# Patient Record
Sex: MALE | Race: WHITE | Employment: FULL TIME | URBAN - METROPOLITAN AREA
[De-identification: names, ages, dates, MRNs, and addresses within clinical notes are randomized per-mention and may not be internally consistent; named-entity substitution may affect disease eponyms.]

---

## 2018-10-06 ENCOUNTER — APPOINTMENT (OUTPATIENT)
Dept: CT IMAGING | Age: 28
End: 2018-10-06
Payer: COMMERCIAL

## 2018-10-06 ENCOUNTER — HOSPITAL ENCOUNTER (EMERGENCY)
Age: 28
Discharge: HOME | End: 2018-10-07
Attending: EMERGENCY MEDICINE
Payer: COMMERCIAL

## 2018-10-06 VITALS
DIASTOLIC BLOOD PRESSURE: 98 MMHG | HEART RATE: 98 BPM | HEIGHT: 68 IN | RESPIRATION RATE: 16 BRPM | TEMPERATURE: 100 F | WEIGHT: 285.5 LBS | BODY MASS INDEX: 43.27 KG/M2 | SYSTOLIC BLOOD PRESSURE: 160 MMHG | OXYGEN SATURATION: 98 %

## 2018-10-06 DIAGNOSIS — R20.2 NUMBNESS AND TINGLING OF LEFT SIDE OF FACE: ICD-10-CM

## 2018-10-06 DIAGNOSIS — R20.2 NUMBNESS AND TINGLING IN LEFT ARM: Primary | ICD-10-CM

## 2018-10-06 DIAGNOSIS — R20.0 NUMBNESS AND TINGLING OF LEFT SIDE OF FACE: ICD-10-CM

## 2018-10-06 DIAGNOSIS — R20.0 NUMBNESS AND TINGLING IN LEFT ARM: Primary | ICD-10-CM

## 2018-10-06 LAB
BASOPHILS ABSOLUTE: 0.1 K/UL (ref 0–0.2)
BASOPHILS RELATIVE PERCENT: 0.5 %
EOSINOPHILS ABSOLUTE: 0.3 K/UL (ref 0–0.6)
EOSINOPHILS RELATIVE PERCENT: 2.7 %
HCT VFR BLD CALC: 41.9 % (ref 40.5–52.5)
HEMOGLOBIN: 14.3 G/DL (ref 13.5–17.5)
LYMPHOCYTES ABSOLUTE: 2.2 K/UL (ref 1–5.1)
LYMPHOCYTES RELATIVE PERCENT: 22.6 %
MCH RBC QN AUTO: 30.4 PG (ref 26–34)
MCHC RBC AUTO-ENTMCNC: 34.2 G/DL (ref 31–36)
MCV RBC AUTO: 88.9 FL (ref 80–100)
MONOCYTES ABSOLUTE: 1.1 K/UL (ref 0–1.3)
MONOCYTES RELATIVE PERCENT: 11.3 %
NEUTROPHILS ABSOLUTE: 6.1 K/UL (ref 1.7–7.7)
NEUTROPHILS RELATIVE PERCENT: 62.9 %
PDW BLD-RTO: 12.7 % (ref 12.4–15.4)
PLATELET # BLD: 325 K/UL (ref 135–450)
PMV BLD AUTO: 7.7 FL (ref 5–10.5)
RBC # BLD: 4.71 M/UL (ref 4.2–5.9)
WBC # BLD: 9.8 K/UL (ref 4–11)

## 2018-10-06 PROCEDURE — 85025 COMPLETE CBC W/AUTO DIFF WBC: CPT

## 2018-10-06 PROCEDURE — 36415 COLL VENOUS BLD VENIPUNCTURE: CPT

## 2018-10-06 PROCEDURE — 99284 EMERGENCY DEPT VISIT MOD MDM: CPT

## 2018-10-06 PROCEDURE — 93005 ELECTROCARDIOGRAM TRACING: CPT | Performed by: EMERGENCY MEDICINE

## 2018-10-06 PROCEDURE — 70450 CT HEAD/BRAIN W/O DYE: CPT

## 2018-10-06 PROCEDURE — 80048 BASIC METABOLIC PNL TOTAL CA: CPT

## 2018-10-07 LAB
ANION GAP SERPL CALCULATED.3IONS-SCNC: 17 MMOL/L (ref 3–16)
BUN BLDV-MCNC: 14 MG/DL (ref 7–20)
CALCIUM SERPL-MCNC: 9.4 MG/DL (ref 8.3–10.6)
CHLORIDE BLD-SCNC: 101 MMOL/L (ref 99–110)
CO2: 23 MMOL/L (ref 21–32)
CREAT SERPL-MCNC: 1 MG/DL (ref 0.9–1.3)
GFR AFRICAN AMERICAN: >60
GFR NON-AFRICAN AMERICAN: >60
GLUCOSE BLD-MCNC: 100 MG/DL (ref 70–99)
POTASSIUM SERPL-SCNC: 3.8 MMOL/L (ref 3.5–5.1)
SODIUM BLD-SCNC: 141 MMOL/L (ref 136–145)

## 2018-10-07 PROCEDURE — 6370000000 HC RX 637 (ALT 250 FOR IP): Performed by: EMERGENCY MEDICINE

## 2018-10-07 PROCEDURE — 93010 ELECTROCARDIOGRAM REPORT: CPT | Performed by: INTERNAL MEDICINE

## 2018-10-07 RX ADMIN — ASPIRIN 325 MG: 325 TABLET, DELAYED RELEASE ORAL at 00:51

## 2018-10-07 NOTE — ED PROVIDER NOTES
[10/06/18 2245]   BP Temp Temp Source Pulse Resp SpO2 Height Weight   (!) 160/98 100 °F (37.8 °C) Oral 98 16 98 % 5' 8\" (1.727 m) 285 lb 7.9 oz (129.5 kg)       Physical Exam  General appearance: Alert, cooperative, no distress, appears stated age. Head:  Normocephalic, without obvious abnormality, atraumatic. HEENT: Mucous membranes moist.  Neck: Full ROM, trachea midline, no JVD  Lungs: No respiratory distress  Cardiovasular: Perfusing extremities  Abdomen: Nontender, no guarding  Extremities: Atraumatic, full ROM  Skin: No rashes or lesions to exposed skin  Neurologic: Alert and oriented x3. Cranial nerves II through XII intact aside from subjective reduced sensation roughly in the left V2 distribution on the cheek but not extending back to the ear. Strength is normal and symmetrical in the extremities. Patient does have subjective decreased sensation on the dorsal left forearm and dorsal left hand. DIAGNOSTIC RESULTS     EKG: EKG shows a normal sinus rhythm without STEMI, arrhythmogenic condition such as Brugada/ARVD. QTC/ID intervals normal.    RADIOLOGY:   Non-plain film images such as CT, Ultrasound and MRI are read by the radiologist. Plain radiographic images are visualized and preliminarily interpreted by the emergency physician with the below findings:        Interpretation per the Radiologist below, if available at the time of this note:    CT Head WO Contrast   Final Result   No acute intracranial abnormality.                ED BEDSIDE ULTRASOUND:   Performed by ED Physician - none    LABS:  Labs Reviewed   BASIC METABOLIC PANEL - Abnormal; Notable for the following:        Result Value    Anion Gap 17 (*)     Glucose 100 (*)     All other components within normal limits    Narrative:     Performed at:  [facility]) - [facility] [facility]  [address] [facility]   Phone [phone]   CBC WITH AUTO DIFFERENTIAL    Narrative:     Performed at:  [facility]) -

## 2018-10-09 LAB
EKG ATRIAL RATE: 94 BPM
EKG DIAGNOSIS: NORMAL
EKG P AXIS: 36 DEGREES
EKG P-R INTERVAL: 146 MS
EKG Q-T INTERVAL: 342 MS
EKG QRS DURATION: 76 MS
EKG QTC CALCULATION (BAZETT): 427 MS
EKG R AXIS: 32 DEGREES
EKG T AXIS: 19 DEGREES
EKG VENTRICULAR RATE: 94 BPM

## 2018-10-17 ENCOUNTER — OFFICE VISIT (OUTPATIENT)
Dept: PRIMARY CARE CLINIC | Age: 28
End: 2018-10-17
Payer: COMMERCIAL

## 2018-10-17 VITALS
DIASTOLIC BLOOD PRESSURE: 84 MMHG | BODY MASS INDEX: 43.5 KG/M2 | TEMPERATURE: 98.8 F | HEART RATE: 100 BPM | SYSTOLIC BLOOD PRESSURE: 110 MMHG | HEIGHT: 68 IN | OXYGEN SATURATION: 98 % | WEIGHT: 287 LBS

## 2018-10-17 DIAGNOSIS — R20.2 NUMBNESS AND TINGLING: Primary | ICD-10-CM

## 2018-10-17 DIAGNOSIS — R20.0 NUMBNESS AND TINGLING: Primary | ICD-10-CM

## 2018-10-17 PROCEDURE — 99202 OFFICE O/P NEW SF 15 MIN: CPT | Performed by: NURSE PRACTITIONER

## 2018-10-17 ASSESSMENT — PATIENT HEALTH QUESTIONNAIRE - PHQ9
2. FEELING DOWN, DEPRESSED OR HOPELESS: 0
1. LITTLE INTEREST OR PLEASURE IN DOING THINGS: 0
SUM OF ALL RESPONSES TO PHQ QUESTIONS 1-9: 0
SUM OF ALL RESPONSES TO PHQ QUESTIONS 1-9: 0
SUM OF ALL RESPONSES TO PHQ9 QUESTIONS 1 & 2: 0

## 2018-10-17 ASSESSMENT — ENCOUNTER SYMPTOMS
COUGH: 0
ABDOMINAL PAIN: 0
DIARRHEA: 0
SHORTNESS OF BREATH: 0
EYE DISCHARGE: 0
CONSTIPATION: 0

## 2018-10-17 NOTE — PROGRESS NOTES
[facility] Note    Date: 10/17/2018                                               Subjective/Objective:     Chief Complaint   Patient presents with   Evern Dural ED Follow-up     from 10/06/18 he had face numbness and left arm, but thats all gone now        HPI  69-year-old male patient comes to the office today for an emergency room follow up with regarding the facial numbness that he had. Patient was seen October 6, 2018 in the emergency room with left facial numbness and left arm numbness. Labs were obtained and all were normal he had a head CT was negative for any stroke. Since that time the patient states his symptoms have completely resolved. And he denies any other complaints. Objective   There are no active problems to display for this patient. No past medical history on file. No past surgical history on file.     Admission on 10/06/2018, Discharged on 10/07/2018   Component Date Value Ref Range Status    WBC 10/06/2018 9.8  4.0 - 11.0 K/uL Final    RBC 10/06/2018 4.71  4.20 - 5.90 M/uL Final    Hemoglobin 10/06/2018 14.3  13.5 - 17.5 g/dL Final    Hematocrit 10/06/2018 41.9  40.5 - 52.5 % Final    MCV 10/06/2018 88.9  80.0 - 100.0 fL Final    MCH 10/06/2018 30.4  26.0 - 34.0 pg Final    MCHC 10/06/2018 34.2  31.0 - 36.0 g/dL Final    RDW 10/06/2018 12.7  12.4 - 15.4 % Final    Platelets 81/33/6803 325  135 - 450 K/uL Final    MPV 10/06/2018 7.7  5.0 - 10.5 fL Final    Neutrophils % 10/06/2018 62.9  % Final    Lymphocytes % 10/06/2018 22.6  % Final    Monocytes % 10/06/2018 11.3  % Final    Eosinophils % 10/06/2018 2.7  % Final    Basophils % 10/06/2018 0.5  % Final    Neutrophils # 10/06/2018 6.1  1.7 - 7.7 K/uL Final    Lymphocytes # 10/06/2018 2.2  1.0 - 5.1 K/uL Final    Monocytes # 10/06/2018 1.1  0.0 - 1.3 K/uL Final    Eosinophils # 10/06/2018 0.3  0.0 - 0.6 K/uL Final    Basophils # 10/06/2018 0.1  0.0 - 0.2 K/uL Final    Sodium 10/06/2018 141  933 - 053